# Patient Record
Sex: FEMALE | Race: WHITE
[De-identification: names, ages, dates, MRNs, and addresses within clinical notes are randomized per-mention and may not be internally consistent; named-entity substitution may affect disease eponyms.]

---

## 2017-05-05 ENCOUNTER — HOSPITAL ENCOUNTER (OUTPATIENT)
Dept: HOSPITAL 45 - C.LABSPEC | Age: 57
Discharge: HOME | End: 2017-05-05
Attending: INTERNAL MEDICINE
Payer: COMMERCIAL

## 2017-05-05 DIAGNOSIS — Z12.11: Primary | ICD-10-CM

## 2017-05-10 ENCOUNTER — HOSPITAL ENCOUNTER (OUTPATIENT)
Dept: HOSPITAL 45 - C.LAB1850 | Age: 57
Discharge: HOME | End: 2017-05-10
Attending: INTERNAL MEDICINE
Payer: COMMERCIAL

## 2017-05-10 DIAGNOSIS — Z13.220: ICD-10-CM

## 2017-05-10 DIAGNOSIS — Z13.1: Primary | ICD-10-CM

## 2017-05-10 LAB
CHOLEST/HDLC SERPL: 2.3 {RATIO}
GLUCOSE UR QL: 95 MG/DL
KETONES UR QL STRIP: 119 MG/DL
NITRITE UR QL STRIP: 43 MG/DL (ref 0–150)
PH UR: 223 MG/DL (ref 0–200)
VERY LOW DENSITY LIPOPROT CALC: 9 MG/DL

## 2017-07-07 ENCOUNTER — HOSPITAL ENCOUNTER (OUTPATIENT)
Dept: HOSPITAL 45 - C.LABSPEC | Age: 57
Discharge: HOME | End: 2017-07-07
Attending: INTERNAL MEDICINE
Payer: COMMERCIAL

## 2017-07-07 DIAGNOSIS — J02.9: Primary | ICD-10-CM

## 2017-09-05 ENCOUNTER — HOSPITAL ENCOUNTER (OUTPATIENT)
Dept: HOSPITAL 45 - C.MAMM | Age: 57
Discharge: HOME | End: 2017-09-05
Attending: INTERNAL MEDICINE
Payer: COMMERCIAL

## 2017-09-05 DIAGNOSIS — Z12.31: Primary | ICD-10-CM

## 2017-09-06 NOTE — MAMMOGRAPHY REPORT
BILATERAL DIGITAL SCREENING MAMMOGRAM TOMOSYNTHESIS WITH CAD: 9/5/2017

CLINICAL HISTORY: Routine screening.  





TECHNIQUE:  Breast tomosynthesis in addition to standard 2D mammography was performed. Current study 
was also evaluated with a Computer Aided Detection (CAD) system.  



COMPARISON: Comparison is made to exams dated:  9/1/2016 mammogram, 8/31/2015 mammogram, 8/28/2014 ma
mmogram, 7/30/2013 mammogram, 2/9/2012 mammogram - [facility], and 3/4/2009.   



BREAST COMPOSITION:  There are scattered areas of fibroglandular density in both breasts.  



FINDINGS:  The parenchymal pattern is unchanged. No developing mass, architectural distortion or clus
ter of suspicious microcalcifications is seen in either breast.  





IMPRESSION:  ACR BI-RADS CATEGORY 2: BENIGN

There is no mammographic evidence of malignancy. A 1 year screening mammogram is recommended.  The pa
tient will receive written notification of the results.  





Approximately 10% of breast cancers are not detected with mammography. A negative mammographic report
 should not delay biopsy if a clinically suggestive mass is present.



Josefina David M.D.          

ay/:9/5/2017 16:58:47  



Imaging Technologist: Shiloh HILTON([email or web address])(AYO), [facility]

letter sent: Normal 1/2  

BI-RADS Code: ACR BI-RADS Category 2: Benign

## 2017-09-12 ENCOUNTER — HOSPITAL ENCOUNTER (OUTPATIENT)
Dept: HOSPITAL 45 - C.CTS | Age: 57
Discharge: HOME | End: 2017-09-12
Attending: INTERNAL MEDICINE
Payer: COMMERCIAL

## 2017-09-12 DIAGNOSIS — R91.8: Primary | ICD-10-CM

## 2017-09-12 NOTE — DIAGNOSTIC IMAGING REPORT
CT OF THE CHEST WITHOUT IV CONTRAST



CLINICAL HISTORY: Multiple lung nodules.    



COMPARISON STUDY:  Chest CT March 14, 2016 and September 7, 2016. 



CT DOSE: 190.93 mGy.cm



TECHNIQUE:  Axial images of the chest were obtained without IV contrast.  Images

were reviewed in the axial, sagittal, and coronal planes. IV contrast was not

administered for this examination.  A dose lowering technique was utilized

adhering to the principles of ALARA.





FINDINGS:  No enlarged axillary, mediastinal or hilar lymph nodes are present.

The size of heart is normal. There is no pericardial effusion. Central airways

are patent. There is moderate emphysema. No pneumothorax or pleural effusion is

present. There is no consolidation to suggest pneumonia. A 6 mm subpleural left

lower lobe nodule shown image 167 of 306 is unchanged since CT of March 14, 2016. A few additional smaller subpleural nodules are unchanged. No new nodules

are present. The bony thorax and upper abdomen are unremarkable on this

unenhanced examination with the exception of a 2 mm left renal calculus is

present.



IMPRESSION:  



1. No change in multiple pulmonary nodules since initial chest CT of March 14, 2016. These nodules are likely benign. A follow-up chest CT in one year to

ensure stability is recommended. 



2. No acute intrathoracic findings.







Electronically signed by:  Santiago Velázquez M.D.

9/12/2017 4:09 PM



Dictated Date/Time:  9/12/2017 3:59 PM

## 2017-09-28 ENCOUNTER — HOSPITAL ENCOUNTER (OUTPATIENT)
Dept: HOSPITAL 45 - C.PAPS | Age: 57
Discharge: HOME | End: 2017-09-28
Attending: PHYSICIAN ASSISTANT
Payer: COMMERCIAL

## 2017-09-28 DIAGNOSIS — Z12.4: Primary | ICD-10-CM

## 2017-09-28 DIAGNOSIS — N95.2: ICD-10-CM

## 2018-04-24 ENCOUNTER — HOSPITAL ENCOUNTER (OUTPATIENT)
Dept: HOSPITAL 45 - C.LAB1850 | Age: 58
Discharge: HOME | End: 2018-04-24
Attending: INTERNAL MEDICINE
Payer: COMMERCIAL

## 2018-04-24 DIAGNOSIS — Z00.00: Primary | ICD-10-CM

## 2018-04-24 LAB
GLUCOSE SERPL-MCNC: 74 MG/DL (ref 70–99)
KETONES UR QL STRIP: 95 MG/DL
PH UR: 195 MG/DL (ref 0–200)

## 2018-05-14 ENCOUNTER — HOSPITAL ENCOUNTER (OUTPATIENT)
Dept: HOSPITAL 45 - C.LAB1850 | Age: 58
Discharge: HOME | End: 2018-05-14
Attending: INTERNAL MEDICINE
Payer: COMMERCIAL

## 2018-05-14 DIAGNOSIS — Z00.00: Primary | ICD-10-CM

## 2018-05-14 DIAGNOSIS — Z13.1: ICD-10-CM
